# Patient Record
Sex: MALE | URBAN - NONMETROPOLITAN AREA
[De-identification: names, ages, dates, MRNs, and addresses within clinical notes are randomized per-mention and may not be internally consistent; named-entity substitution may affect disease eponyms.]

---

## 2021-01-14 ENCOUNTER — IMPORTED ENCOUNTER (OUTPATIENT)
Dept: URBAN - NONMETROPOLITAN AREA CLINIC 1 | Facility: CLINIC | Age: 41
End: 2021-01-14

## 2021-01-14 PROBLEM — H52.11: Noted: 2021-01-14

## 2021-01-14 PROBLEM — H52.4: Noted: 2021-01-14

## 2021-01-14 PROBLEM — H52.223: Noted: 2021-01-14

## 2021-01-14 PROCEDURE — S0620 ROUTINE OPHTHALMOLOGICAL EXA: HCPCS

## 2021-01-14 NOTE — PATIENT DISCUSSION
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

## 2022-04-10 ASSESSMENT — VISUAL ACUITY
OS_CC: 20/20-
OU_SC: 20/20
OD_CC: 20/25-2

## 2022-04-10 ASSESSMENT — TONOMETRY
OD_IOP_MMHG: 16
OS_IOP_MMHG: 15